# Patient Record
(demographics unavailable — no encounter records)

---

## 2024-10-15 NOTE — ASSESSMENT
[FreeTextEntry1] : #Lichen Planopilaris, posterior scalp - new dx of uncertain prognosis Biopsy proven 8/26/24 Pathology findings of "Perifollicular fibromyxoid change and lymphocytic infiltrate, consistent with  lichen planopilaris in a correct clinical setting" reviewed with patient - Diagnosis, chronic nature, disease course, treatment options and goals of therapy discussed - C/w triamcinolone ointment BID PRN itch or pain on scalp for 2 weeks on, 1 week off cycles. SED, avoid on face/groin  #Seborrheic dermatitis - chronic; stable - Continue maintenance therapy with ketoconazole shampoo alternating with OTC Selsun Blue/Tgel/H&S  #Folliculitis - chronic; stable - Refilled clindamycin solution  RTC 6mo FBSE

## 2024-10-15 NOTE — HISTORY OF PRESENT ILLNESS
[FreeTextEntry1] : RPV: LPP [de-identified] : ALF MOFFETT is a 71 year old w/hx of Heart Transplant on Cellcept and prograf here for suture removal. Last FBSE 8/26/24  #LPP, posterior scalp - s/p biopsy 8/26/24  "Perifollicular fibromyxoid change and lymphocytic infiltrate, consistent with  lichen planopilaris in a correct clinical setting" Patient endorses improvement in itch while using triamcinolone with near 90% improvement, requesting refills of triamcinolone.  #Also would like refill of clindamycin for chest folliculitis  Derm Hx: AKs; seb derm of face (controlled with PRN HC/desonide topical); toenail onychomycosis Personal Hx of skin cancer: yes, multiple NMSC - SCCis on crown/frontal scalp, bx 1/24/22, s/p Efudex cream x3 weeks with clearance in March 2022 - BCC R nasal ala s/p Mohs w/ Dr. Light in Feb 2020  - SCCIS vertex scalp s/p Mohs w/ Dr. Light  in June 2019 - BCC R forearm s/p Mohs with Dr. Guzman in 2015 FHx of skin cancer: Mother with NMSC Social Hx: Retired banker  Cardiac transplant specialist: Dr. Jamal Galan ([facility] [phone]) PCP: Dr. Eleazar Sorto ([phone])

## 2024-10-15 NOTE — HISTORY OF PRESENT ILLNESS
[FreeTextEntry1] : RPV: LPP [de-identified] : ALF MOFFETT is a 71 year old w/hx of Heart Transplant on Cellcept and prograf here for suture removal. Last FBSE 8/26/24  #LPP, posterior scalp - s/p biopsy 8/26/24  "Perifollicular fibromyxoid change and lymphocytic infiltrate, consistent with  lichen planopilaris in a correct clinical setting" Patient endorses improvement in itch while using triamcinolone with near 90% improvement, requesting refills of triamcinolone.  #Also would like refill of clindamycin for chest folliculitis  Derm Hx: AKs; seb derm of face (controlled with PRN HC/desonide topical); toenail onychomycosis Personal Hx of skin cancer: yes, multiple NMSC - SCCis on crown/frontal scalp, bx 1/24/22, s/p Efudex cream x3 weeks with clearance in March 2022 - BCC R nasal ala s/p Mohs w/ Dr. Light in Feb 2020  - SCCIS vertex scalp s/p Mohs w/ Dr. Light  in June 2019 - BCC R forearm s/p Mohs with Dr. Guzman in 2015 FHx of skin cancer: Mother with NMSC Social Hx: Retired banker  Cardiac transplant specialist: Dr. Jamal Galan ([facility] [phone]) PCP: Dr. Eleazar Sorto ([phone])

## 2024-10-15 NOTE — PHYSICAL EXAM
[Alert] : alert [Oriented x 3] : ~L oriented x 3 [Well Nourished] : well nourished [Conjunctiva Non-injected] : conjunctiva non-injected [No Visual Lymphadenopathy] : no visual  lymphadenopathy [No Clubbing] : no clubbing [No Edema] : no edema [No Bromhidrosis] : no bromhidrosis [No Chromhidrosis] : no chromhidrosis [Full Body Skin Exam Performed] : performed [Declined] : declined [FreeTextEntry3] : Focused exam only (see below) per patient request:  erythematous thin plaque R vertex/superior occiput scalp
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
## 2024-11-06 NOTE — PHYSICAL EXAM
[Obese] : obese [No Xanthelasma] : no xanthelasma [Normal Venous Pressure] : normal venous pressure [No Murmur] : no murmur [No Rub] : no rub [No Gallop] : no gallop [Soft] : abdomen soft [Non Tender] : non-tender [Normal Bowel Sounds] : normal bowel sounds [Normal] : no edema, no cyanosis, no clubbing, no varicosities [Normal Radial B/L] : normal radial B/L [No Rash] : no rash [Moves all extremities] : moves all extremities [Normal Speech] : normal speech [Alert and Oriented] : alert and oriented [de-identified] : unable to assess - wearing a mask. [de-identified] : JVP < 6 cm H2O, no HJR [de-identified] : tachycardic, but regular S1S2 [de-identified] : unable to appreciate HSM or masses due to body habitus [de-identified] : hard of hearing

## 2024-11-06 NOTE — ASSESSMENT
[FreeTextEntry1] : 70 y/o M s/p OHT 2013 (CMV -/+, Toxo -/-) at [facility], unremarkable post operative course, most recent active issues include CKD 3, BPH s/p TURP 2023, squamous cell carcinoma, and basal cell carcinoma, who transferred his transplant care to [facility], and is here today for routine follow-up. He is overall doing very well.  s/p OHT and immunosuppression - continue tacrolimus goal ~5-7. - continue cellcept 250mg po bid (reduced from 500 mg BID on 22) due to hx of skin cancer and leukopenia - remains off prednisone  #TCAD prevention  - continue ASA/statin. Lipid panel 6/3/24:  TG 88 HDL 41 LDL 79. Switched from pravastatin to crestor 10mg on 22. Encouraged weight loss and an improved diet, goal LDL < 70. Check lipid panel with next set of labs - Annual Dobutamine stress echo 22 showed no inducible ischemia - TTE 23 showed normal biventricular function - repeat TTE done today, will followup results - Plan for  TTE in 6 months - Heartcare 23: allomap =38, allosure < 0.04 (previous allomaps 30-37); Will obtain a heartcare today - This patient has a heart allograft and is at risk for rejection through immune system recognition of non-self tissue. AlloMap and AlloSure and noninvasive tests ordered to evaluate for the probability of rejection after pretest and assist in immunosuppression management. Studies have shown that these tests can help to rule out rejection without subjecting the patient to the bleeding, arrhythmia, and structural damage risks of invasive endomyocardial biopsies. The AlloMap and AlloSure tests help guide clinical decision making for this patient's surveillance schedule and immunosuppression adjustments.  #CKD stage 3b - stable - minimize any nephrotoxic medications - avoided mTOR given recurrent skin lesions/peels and need for wound healing  #Squamous cell carcinoma in situ of the crown of the scalp and Actinic keratoses, s/p Tissue biopsy 22 - [facility] dermatology following - continue topical chemotherapy with Efudex - F/u every 3-6 months  #BPH, overactive bladder - pt followed by [facility] urology -s/p vaporization of prostate stricture on  with Dr Shailesh Vargas - S/p TURP procedure 2023 - c/w finasteride  #obese - pt had lost weight earlier this year with exercise and dieting, encouraged to continue weight loss and exercise  #osteopenia - referral to Dr. Rich for bone health consult - continue calcium +vitamin d 1 tablet bid  #health maintenance - continue folic acid and MVI daily - continue magnesium oxide  - DEXA 2023: +osteopenia - colonoscopy 21: +diverticulosis in the sigmoid colon and in the ascending colon.  Repeat in 5 years for surveillance. - f/u with PMD for RHCM

## 2024-11-06 NOTE — CARDIOLOGY SUMMARY
[de-identified] : 11/6/24: pending 07/12/23:  bpm, low voltage in precordial leads, nonspecific Tw abnormality. Compared to 5/3/23 no significant change. 05/03/23:  bpm, low voltage in precordial leads, nonspecific Tw abnormality. Compared to 9/14/22, no significant change. 09/14/22:  bpm, low voltage in precordial leads, nonspecific Tw abnormality. Compared to 3/23/22, no significant change. 3/23/22: , low voltage in precordial leads, diffuse nonspecific Tw abnormality. Compared to 9/21/21 no significant change. [de-identified] : 6/12/24: TTE: CONCLUSIONS:  1. Left ventricular cavity is normal in size. Left ventricular wall thickness is normal. Left ventricular systolic function is normal with an ejection fraction of 69 % by 3D. There are no regional wall motion abnormalities seen.  2. Normal left ventricular diastolic function, with normal filling pressure.  3. Normal right ventricular cavity size, with normal wall thickness, and normal systolic function. Tricuspid annular plane systolic excursion (TAPSE) is 1.7 cm (normal >=1.7 cm). Tricuspid annular tissue Doppler S' is 12.0 cm/s (normal >10 cm/s).  4. Estimated pulmonary artery systolic pressure is 27 mmHg.  5. No pericardial effusion seen.  6. Compared to the transthoracic echocardiogram performed on 11/27/2023, there have been no significant interval changes.  7. Left ventricular global longitudinal strain is -18.1 % which is normal (< -18%). Images were acquired on a Hester ultrasound system and processed using [employer] strain analysis software with a heart rate of 88 bpm and a blood pressure of 14/95 mmHg.   11/27/23 Echo: LVEF 63%, GLS -20.0%, normal biv function, no valvular abnormalites, no pericardial effusion. compared to TTE 4/26/23 there have been no significant interval changes 4/26/23 Echo: LVEF 56%, LVIDd 4.8cm, normal biv function, no valvular abnormalities, no significant TR 9/14/22: Dobutamine stress echo: Conclusions: normal hemodynamic response, normal EKG response, normal augmentation in LVSF, marked increase in the LV and RV contractility, no segmental wall motion abnormalities. Normal pharmacologic stress response with no evidence of inducible ischemia. Compared to TTE 3/23/22, no significant changes noted. 3/23/22: EF >75% LVIDd 2.6cm, normal biV function, no valvular abnormalities, minimal TR, and no pericardial effusion  8/21/20 TTE @ [facility]: normal biV function, similar to the study from 9/11/2019 [de-identified] : \par  Myocardial Perfusion PET @ [facility] (8/5/2019): Impression: no chest discomfort and no ECG ischemia after regadenoson stress. No evidence of abnormal myocardial perfusion. Excellent quantitative blood flow values, thus no evidence of CAV. Preserved LV systolic function. No coronary calcium was appreciated.  [de-identified] : \tahir HANSON @ [facility] 8/21/20: no coronary artery disease [de-identified] : DEXA 4/24/23: osteopenia. TREATMENT RECOMMENDATIONS:  Based on NOF treatment guidelines medical therapy is not recommended at this time.
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
## 2024-11-20 NOTE — HEALTH RISK ASSESSMENT
[Yes] : Yes [Monthly or less (1 pt)] : Monthly or less (1 point) [No] : In the past 12 months have you used drugs other than those required for medical reasons? No [No falls in past year] : Patient reported no falls in the past year [0] : 2) Feeling down, depressed, or hopeless: Not at all (0) [PHQ-2 Negative - No further assessment needed] : PHQ-2 Negative - No further assessment needed [Never] : Never [Patient reported bone density results were abnormal] : Patient reported bone density results were abnormal [Patient reported colonoscopy was abnormal] : Patient reported colonoscopy was abnormal [HIV test declined] : HIV test declined [Hepatitis C test declined] : Hepatitis C test declined [None] : None [With Significant Other] : lives with significant other [Retired] : retired [] :  [Sexually Active] : sexually active [Fully functional (bathing, dressing, toileting, transferring, walking, feeding)] : Fully functional (bathing, dressing, toileting, transferring, walking, feeding) [Fully functional (using the telephone, shopping, preparing meals, housekeeping, doing laundry, using] : Fully functional and needs no help or supervision to perform IADLs (using the telephone, shopping, preparing meals, housekeeping, doing laundry, using transportation, managing medications and managing finances) [Reports changes in hearing] : Reports changes in hearing [Smoke Detector] : smoke detector [Carbon Monoxide Detector] : carbon monoxide detector [Seat Belt] :  uses seat belt [With Patient/Caregiver] : , with patient/caregiver [de-identified] : Has been walking more. [ECV1Omlma] : 0 [Change in mental status noted] : No change in mental status noted [Language] : denies difficulty with language [Behavior] : denies difficulty with behavior [Handling Complex Tasks] : denies difficulty handling complex tasks [Reasoning] : denies difficulty with reasoning [Reports changes in vision] : Reports no changes in vision [Reports changes in dental health] : Reports no changes in dental health [Sunscreen] : does not use sunscreen [BoneDensityDate] : 04/23 [BoneDensityComments] : Osteopenia [ColonoscopyDate] : 06/21 [ColonoscopyComments] : 6/21/2021, 1 tubular adenoma, Dr. Jett Aponte, 5-year follow-up. [de-identified] : Severe hearing loss.  Needs hearing aids. Tried them - didn't help. [de-identified] : Going regularly. [de-identified] : Dr. Lorenzo Thomas, Dr. Gifford [AdvancecareDate] : 11/20/2024 [FreeTextEntry4] : Written materials for preparing a Health Care Proxy were previously given to patient, and I encouraged the patient to complete a Health Care Proxy.

## 2024-11-20 NOTE — PHYSICAL EXAM
[No Acute Distress] : no acute distress [Well Nourished] : well nourished [Well Developed] : well developed [Well-Appearing] : well-appearing [Normal Voice/Communication] : normal voice/communication [Normal Sclera/Conjunctiva] : normal sclera/conjunctiva [PERRL] : pupils equal round and reactive to light [EOMI] : extraocular movements intact [Normal Outer Ear/Nose] : the outer ears and nose were normal in appearance [Normal Oropharynx] : the oropharynx was normal [Normal TMs] : both tympanic membranes were normal [No JVD] : no jugular venous distention [No Lymphadenopathy] : no lymphadenopathy [Supple] : supple [Thyroid Normal, No Nodules] : the thyroid was normal and there were no nodules present [No Respiratory Distress] : no respiratory distress  [No Accessory Muscle Use] : no accessory muscle use [Clear to Auscultation] : lungs were clear to auscultation bilaterally [Normal Rate] : normal rate  [Regular Rhythm] : with a regular rhythm [Normal S1, S2] : normal S1 and S2 [No Murmur] : no murmur heard [No Carotid Bruits] : no carotid bruits [No Abdominal Bruit] : a ~M bruit was not heard ~T in the abdomen [No Varicosities] : no varicosities [Pedal Pulses Present] : the pedal pulses are present [No Edema] : there was no peripheral edema [No Palpable Aorta] : no palpable aorta [No Extremity Clubbing/Cyanosis] : no extremity clubbing/cyanosis [Soft] : abdomen soft [Non Tender] : non-tender [Non-distended] : non-distended [No Masses] : no abdominal mass palpated [No HSM] : no HSM [Normal Bowel Sounds] : normal bowel sounds [No Hernias] : no hernias [Declined Rectal Exam] : declined rectal exam [Penis Abnormality] : normal uncircumcised penis [Testes Tenderness] : no tenderness of the testes [Scrotum] : the scrotum was normal [Testes Mass (___cm)] : there were no testicular masses [No CVA Tenderness] : no CVA  tenderness [No Spinal Tenderness] : no spinal tenderness [No Joint Swelling] : no joint swelling [Grossly Normal Strength/Tone] : grossly normal strength/tone [No Rash] : no rash [Coordination Grossly Intact] : coordination grossly intact [No Focal Deficits] : no focal deficits [Normal Gait] : normal gait [Deep Tendon Reflexes (DTR)] : deep tendon reflexes were 2+ and symmetric [Normal Affect] : the affect was normal [Normal Insight/Judgement] : insight and judgment were intact [FreeTextEntry1] : (deferred - done by Urologist)

## 2024-11-20 NOTE — HISTORY OF PRESENT ILLNESS
[de-identified] : Patient presents for a subsequent Medicare Annual Wellness Visit.  Patient is a 72-year-old male with a history of a cardiac transplant at [facility] 2013.  He previously had an MI 2008 with severe heart failure.  He is now followed by Dr. Gutierrez ([facility]) and is on anti-rejection medications.  He has CKD, prediabetes, BPH, osteopenia, hearing loss.  He sees Dr. Mims () for BPH and ED.  He had a TURP in 2023.  He will be seeing nephrology soon for the CKD.  Patient has been having difficulty sleeping.  He does watch TV and falls asleep watching it.  He has mirtazapine 15mg with some help.  He has had sleep evaluations twice.  Patient has some memory loss.  He forgets where he put things.  He sometimes forgets what he was trying to do at a given moment.  He will see his Neurologist.

## 2025-01-03 NOTE — HISTORY OF PRESENT ILLNESS
[FreeTextEntry8] : Patient has a URI for 3 days.  He was coughing, which was worsening.  He had some phlegm production, which was clear and non-bloody.  No fevers, chills, chest pain, dyspnea.  No body aches other than chronic back pains.  No rashes.  No GI upset other than constipation.  No travel or sick contacts.  He took Mucinex-DM which helped.  He got drowsy from it.  He developed a sore throat 2 days ago, and this was worse last night.  He had negative home COVID-19 tests last night and this morning.

## 2025-01-03 NOTE — PHYSICAL EXAM
[Normal Voice/Communication] : normal voice/communication [Normal Outer Ear/Nose] : the outer ears and nose were normal in appearance [Normal TMs] : both tympanic membranes were normal [Normal] : normal rate, regular rhythm, normal S1 and S2 and no murmur heard [No Edema] : there was no peripheral edema [Soft] : abdomen soft [Non Tender] : non-tender [Non-distended] : non-distended [No Masses] : no abdominal mass palpated [Normal Bowel Sounds] : normal bowel sounds [Normal Posterior Cervical Nodes] : no posterior cervical lymphadenopathy [Normal Anterior Cervical Nodes] : no anterior cervical lymphadenopathy [de-identified] : +mild pharyngeal erythema.  No sinus tenderness.

## 2025-01-03 NOTE — ASSESSMENT
[FreeTextEntry1] : Patient has a URI which is likely viral.  Will send a throat culture to rule out strep throat.  Would hold antibiotics for now, but he can hold onto a script for Augmentin for now.  If he gets worsening symptoms, such as fever, purulent blood tinged sputum, then he can start the antibiotic.  He can use the benzonatate for the cough (refill given today).  Patient is to get adequate rest/sleep, hydration, and nutrition, and call for worsening or nonresolving symptoms.

## 2025-01-17 NOTE — ASSESSMENT
[FreeTextEntry1] : Patient is a 71 yo M with PMHx of heart transplant and BPH status post TURP who presents for urinary urgency and urethral stricture.  History of BPH status post cystoscopy with transurethral resection of prostate and removal of bladder stones on 7/26/23 by Dr. Palomino - initially LUTS improved but now worsened Cystoscopy in office showed prostatic stricture. S/p TUIP/BN, steroid injection 4/17/24.  Overall improved/stable LUTS. UF today only 60cc vv, Qmax therefore lower 6ml/s and less accurate PVR 25 cc Cont vesicare  F/u 4-5 mos  Pt will require longitudinal care for pt's chronic/complex urologic conditions.

## 2025-01-17 NOTE — HISTORY OF PRESENT ILLNESS
[FreeTextEntry1] : Patient is a 72 yo M with a history of BPH status pos TURP who presents for  HPI: History of BPH status post cystoscopy with transurethral resection of prostate and removal of bladder stones on 7/26/23 by Dr. Palomino. He subsequently developed increased nocturia, FOS weaker, straining. He has stopped terazosin and finasteride post procedure. Then he tried solifernacin for urinary frequency and urgency with slight improvement. Nocturia x 5-6, weaker stream, incomplete empty well. He said his stream was previously strong and occasionally its strong now too, but has become weaker and now has intermittency with his urinary stream. He underwent cystoscopy today with intraprostatic stricture noted. 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 Here for f/u. Reports doing very well. At least 80% better. Voiding well, good FOS. No incontinence. However, he was feeling more frequency/urgency/nocturia ~2-3. Denies straining, hematuria, dysuria. He was given vesicare 10 mg - reports ~40-50% improvement in frequency/urgency. Nocturia now x1-2. Tolerating well. He had a strong urge to void today -- voided 140cc for UF, good Qmax overall 14.3 with bell shaped curve. PVR 4cc.  1/17/25 Patient presents for follow up. Reports FOS stronger. continuous improvement on vesicare.  Feels urgency/frequency is much improved - approximately 50-60%, sensation of emptying well, nocturia x 1-2.   Feels he is voiding better now than immediately after surgery.    PSA 0.27 - 6/2024 11/2024  Cr - 1.8, CBC wnl

## 2025-01-22 NOTE — PHYSICAL EXAM
[General Appearance - Alert] : alert [General Appearance - In No Acute Distress] : in no acute distress [Sclera] : the sclera and conjunctiva were normal [PERRL With Normal Accommodation] : pupils were equal in size, round, and reactive to light [Extraocular Movements] : extraocular movements were intact [Auscultation Breath Sounds / Voice Sounds] : lungs were clear to auscultation bilaterally [Heart Rate And Rhythm] : heart rate was normal and rhythm regular [Heart Sounds] : normal S1 and S2 [Heart Sounds Gallop] : no gallops [Murmurs] : no murmurs [Heart Sounds Pericardial Friction Rub] : no pericardial rub [Edema] : there was no peripheral edema [Bowel Sounds] : normal bowel sounds [Abdomen Soft] : soft [Abdomen Tenderness] : non-tender [] : no hepato-splenomegaly [Abdomen Mass (___ Cm)] : no abdominal mass palpated [No CVA Tenderness] : no ~M costovertebral angle tenderness [No Spinal Tenderness] : no spinal tenderness [Abnormal Walk] : normal gait [Nail Clubbing] : no clubbing  or cyanosis of the fingernails [Musculoskeletal - Swelling] : no joint swelling seen [Motor Tone] : muscle strength and tone were normal [Deep Tendon Reflexes (DTR)] : deep tendon reflexes were 2+ and symmetric [Sensation] : the sensory exam was normal to light touch and pinprick [No Focal Deficits] : no focal deficits [Oriented To Time, Place, And Person] : oriented to person, place, and time [Impaired Insight] : insight and judgment were intact [Affect] : the affect was normal

## 2025-01-24 NOTE — ASSESSMENT
[FreeTextEntry1] : 72 M with history of HTN, HLD, CAD/MI, HF s/p heart transplant in 2013 @ [facility], CKD, skin cancers, BPH s/p cystoscopy and TURP, bladder stones, who presents for evaluation of abnormal renal function.  - CKD stage 3 due to CRS +/- CNI nephrotox with overall stable renal indices albeit uptick in creatinine a/w supratherapeutic FK for which tacro was reduced.  Prior to cardiac transplant he had evidence of CKD with an elevated creatinine, then post transplant his Cr maycol and stabilized around ~1.6-1.7, non-proteinuric. He had a uptick in creatinine in 11/2024 to 1.8 a/w likely supratherapeutic FK levels thus tacrolimus dose decreased slightly. He is on lower dose MMF d/t skin cancer. Likely CKD stage 3 due to history of cardio-renal syndrome, CNI nephrotoxicity with a baseline creatinine ~1.7. He is non-proteinuric.    PLAN: - labs with cardiac team as planned - immunosuppression per cardiac team, if renal indies worsen can consider reducing tacro dose and adding sirolimus for coverage but not necessary at this time  - avoid NSAIDs and nephrotoxins as able - dose meds for current eGFR - f/u in ~6 months as planned

## 2025-01-24 NOTE — HISTORY OF PRESENT ILLNESS
[FreeTextEntry1] : 72 M with history of HTN, HLD, CAD/MI, HF s/p heart transplant in 2013 @ [facility], CKD, skin cancers, BPH s/p cystoscopy and TURP, bladder stones, who presents for evaluation of abnormal renal function. Has not seen nephrology in the past. Has noted elevated creatinine of ~1.4  pre-transplant where as post transplant his Cr maycol and stabilized around ~1.6-1.7. He had a uptick in creatinine in 11/2024 to 1.8 a/w likely supratherapeutic FK levels thus tacrolimus dose decreased slightly. He is on lower dose MMF d/t skin cancer. Likely CKD stage 3 due to history of cardio-renal syndrome, CNI nephrotoxicity with a baseline creatinine ~1.7. He is non-proteinuric. He has pre-diabetes, hgba1c stable. He has HTN that is well managed. He no longer has issues with hypervolemia since transplant. He denies NSAIDs, herbal meds, history of kidney transplant, history of dialysis, history of nephrolithiasis or gout. He denies CP, SOB, abd pain, N/V/D, foamy urine, hematuria, dysuria, fever or chills.

## 2025-01-24 NOTE — HISTORY OF PRESENT ILLNESS
[FreeTextEntry1] : 72 M with history of HTN, HLD, CAD/MI, HF s/p heart transplant in 2013 @ [facility], CKD, skin cancers, BPH s/p cystoscopy and TURP, bladder stones, who presents for evaluation of abnormal renal function. Has not seen nephrology in the past. Has noted elevated creatinine of ~1.4  pre-transplant where as post transplant his Cr maycol and stabilized around ~1.6-1.7. He had a uptick in creatinine in 11/2024 to 1.8 a/w likely supratherapeutic FK levels thus tacrolimus dose decreased slightly. He is on lower dose MMF d/t skin cancer. Likely CKD stage 3 due to history of cardio-renal syndrome, CNI nephrotoxicity with a baseline creatinine ~1.7. He is non-proteinuric. He has pre-diabetes, hgba1c stable. He has HTN that is well managed. He no longer has issues with hypervolemia since transplant. He denies NSAIDs, herbal meds, history of kidney transplant, history of dialysis, history of nephrolithiasis or gout. He denies CP, SOB, abd pain, N/V/D, foamy urine, hematuria, dysuria, fever or chills.

## 2025-01-24 NOTE — ASSESSMENT
[FreeTextEntry1] : 72 M with history of HTN, HLD, CAD/MI, HF s/p heart transplant in 2013 @ [facility], CKD, skin cancers, BPH s/p cystoscopy and TURP, bladder stones, who presents for evaluation of abnormal renal function.  - CKD stage 3 due to CRS +/- CNI nephrotox with overall stable renal indices albeit uptick in creatinine a/w supratherapeutic FK for which tacro was reduced.  Prior to cardiac transplant he had evidence of CKD with an elevated creatinine, then post transplant his Cr maycol and stabilized around ~1.6-1.7, non-proteinuric. He had a uptick in creatinine in 11/2024 to 1.8 a/w likely supratherapeutic FK levels thus tacrolimus dose decreased slightly. He is on lower dose MMF d/t skin cancer. Likely CKD stage 3 due to history of cardio-renal syndrome, CNI nephrotoxicity with a baseline creatinine ~1.7. He is non-proteinuric.    PLAN: - labs with cardiac team as planned - immunosuppression per cardiac team, if renal indies worsen can consider reducing tacro dose and adding sirolimus for coverage but not necessary at this time  - avoid NSAIDs and nephrotoxins as able - dose meds for current eGFR - f/u in ~6 months as planned

## 2025-01-24 NOTE — ASSESSMENT
[FreeTextEntry1] : 72 M with history of HTN, HLD, CAD/MI, HF s/p heart transplant in 2013 @ [facility], CKD, skin cancers, BPH s/p cystoscopy and TURP, bladder stones, who presents for evaluation of abnormal renal function.  - CKD stage 3 due to CRS +/- CNI nephrotox with overall stable renal indices albeit uptick in creatinine a/w supratherapeutic FK for which tacro was reduced.  Prior to cardiac transplant he had evidence of CKD with an elevated creatinine, then post transplant his Cr maycol and stabilized around ~1.6-1.7, non-proteinuric. He had a uptick in creatinine in 11/2024 to 1.8 a/w likely supratherapeutic FK levels thus tacrolimus dose decreased slightly. He is on lower dose MMF d/t skin cancer. Likely CKD stage 3 due to history of cardio-renal syndrome, CNI nephrotoxicity with a baseline creatinine ~1.7. He is non-proteinuric.    PLAN: - labs with cardiac team as planned - immunosuppression per cardiac team, if renal indies worsen can consider reducing tacro dose and adding sirolimus for coverage but not necessary at this time  - avoid NSAIDs and nephrotoxins as able - dose meds for current eGFR - f/u in ~6 months as planned

## 2025-01-24 NOTE — HISTORY OF PRESENT ILLNESS
[FreeTextEntry1] : 72 M with history of HTN, HLD, CAD/MI, HF s/p heart transplant in 2013 @ [facility], CKD, skin cancers, BPH s/p cystoscopy and TURP, bladder stones, who presents for evaluation of abnormal renal function. Has not seen nephrology in the past. Has noted elevated creatinine of ~1.4  pre-transplant where as post transplant his Cr maycol and stabilized around ~1.6-1.7. He had a uptick in creatinine in 11/2024 to 1.8 a/w likely supratherapeutic FK levels thus tacrolimus dose decreased slightly. He is on lower dose MMF d/t skin cancer. Likely CKD stage 3 due to history of cardio-renal syndrome, CNI nephrotoxicity with a baseline creatinine ~1.7. He is non-proteinuric. He has pre-diabetes, hgba1c stable. He has HTN that is well managed. He no longer has issues with hypervolemia since transplant. He denies NSAIDs, herbal meds, history of kidney transplant, history of dialysis, history of nephrolithiasis or gout. He denies CP, SOB, abd pain, N/V/D, foamy urine, hematuria, dysuria, fever or chills.

## 2025-04-07 NOTE — ASSESSMENT
[FreeTextEntry1] : #Seborrheic Keratosis #Lentigines  #Cherry angioma #Multiple melanocytic nevi, benign  #Screening exam for skin cancer - benign findings as above  - TBSE performed today - Advised sun protection.  Recommended OTC sunscreen products, including SPF30+ with broadband UV protection as well as proper use.  Discussed OTC sun protective clothing - Counseled patient to monitor for changes, including ABCDEs of mole monitoring - Discussed self-skin exams. Counseled patient to monitor for changes, including mole monitoring and self-skin exams - rtc q6-12 mo for repeat skin exam or sooner if new/concerning lesion  #Hx of NMSC - NER  #Lichen Planopilaris, posterior scalp - Chronic; stable/improved Biopsy proven 8/26/24 Pathology findings of "Perifollicular fibromyxoid change and lymphocytic infiltrate, consistent with lichen planopilaris in a correct clinical setting" reviewed with patient - Diagnosis, chronic nature, disease course, treatment options and goals of therapy discussed - For now can stop ointment.  If flaring, can continue triamcinolone ointment BID PRN itch or pain on scalp for 2 weeks on, 1 week off cycles. SED, avoid on face/groin  #Seborrheic dermatitis, scalp & face - Chronic condition; stable - Continue ketoconazole 2% shampoo to scalp, apply 2-3x per week, leave in for 5 mins at a time and rinse  - Continue keto cream prn  #Actinic Keratoses x4, Scalp - Patient was verbally consented and the lesions identified above were treated with liquid nitrogen freeze, thaw, freeze x 10 seconds each cycle x 2. Side effects include blister formation, hypopigmentation, and scarring. The patient tolerated the procedure well. Wound care instructions, care of a blister with vaseline, signs and symptoms of infection were discussed in full. The patient denies any questions at this time.  #Inflamed Seborrheic Keratosis - x3, R groin, forehead - Given inflammatory nature of lesions above, will treat with cryosurgery - Patient was verbally consented. Lesions treated with liquid nitrogen f/t/f x2 cycles. Patient tolerated it well. - Side effects include blister formation, hypopigmentation, and scarring - Wound care with vaseline

## 2025-04-07 NOTE — PHYSICAL EXAM
[Alert] : alert [Oriented x 3] : ~L oriented x 3 [Well Nourished] : well nourished [Conjunctiva Non-injected] : conjunctiva non-injected [No Visual Lymphadenopathy] : no visual  lymphadenopathy [No Clubbing] : no clubbing [No Edema] : no edema [No Bromhidrosis] : no bromhidrosis [No Chromhidrosis] : no chromhidrosis [Full Body Skin Exam Performed] : performed [FreeTextEntry3] : Full body skin exam was performed. The patient is well-appearing, in no acute distress, alert and oriented x 3. Mood and affect are normal. A complete cutaneous examination of the scalp, face, neck, chest, abdomen, back, bilateral arms, bilateral legs, buttocks, digits, nails, eyelids, conjunctiva and lips reveals the following significant findings: - Several tan to dark brown homogenous well demarcated macules scattered over body - Scattered round bright red monomorphous papules over trunk and extremities - Scattered flat round brown waxy papules - Several ill defined light pink macules with overlying adherent scale on scalp

## 2025-04-07 NOTE — HISTORY OF PRESENT ILLNESS
[FreeTextEntry1] : RPV: spots [de-identified] : ALF MOFFETT is a 72 year old w/hx of Heart Transplant on Cellcept and prograf here for suture removal.  #Spots on skin- spots scattered on body x years. Asymptomatic and unchanged. Requesting TBSE.  #LPP, posterior scalp - s/p biopsy 8/26/24 "Perifollicular fibromyxoid change and lymphocytic infiltrate, consistent with lichen planopilaris in a correct clinical setting" Overall significantly improved, using TAC ointment 2 weeks on, and keto cream on 1 week off with keto shampoo.  Derm Hx: AKs; seb derm of face (controlled with PRN HC/desonide topical) Personal Hx of skin cancer: yes, multiple NMSC - SCCis on crown/frontal scalp, bx 1/24/22, s/p Efudex cream x3 weeks with clearance in March 2022 - BCC R nasal ala s/p Mohs w/ Dr. Light in Feb 2020  - SCCIS vertex scalp s/p Mohs w/ Dr. Light  in June 2019 - BCC R forearm s/p Mohs with Dr. Guzman in 2015 FHx of skin cancer: Mother with NMSC Social Hx: Retired banker  Cardiac transplant specialist: Dr. Jamal Galan (ANG[facility] [phone]) PCP: Dr. Eleazar Sorto ([phone])

## 2025-04-15 NOTE — ASSESSMENT
[FreeTextEntry1] : Patient s/p cardiac transplant, and followed by transplant team.  His BP is adequately controlled, and his renal function is stable at CKD-3.  He feels well.  No changes to regimen today.  I advised patient that he can get a booster dose of the [id number] COVID-19 booster that he received Oct 2024, as it has been 6 months.  He will consider this.  Follow-up for physical Fall 2025.

## 2025-04-15 NOTE — HISTORY OF PRESENT ILLNESS
[de-identified] : Patient for follow-up.  Patient feels well with no chest pain, dyspnea, palpitations.  His BPs at home are 110s/70s-80s..  Weight 186.6 at home.  He has seen nephrology recently, and no changes to his regimen were needed.  He is planned for an echocardiogram and stress test soon.  He returned to the audiologist and got hearing aids.
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
## 2025-05-05 NOTE — HISTORY OF PRESENT ILLNESS
[FreeTextEntry1] : Primary diagnosis: Osteopenia Other medical problems: OHT in 2013  Interval history: No fractures, no falls. Mentions that he had TURP procedure last month   #Osteopenia Diagnosis: 2021 Current regimen: calcium citrate + Vitamin D 4000IU daily Prior treatment: Denies   Last DXA scan:  DXA  April 2023 Spine 0.2 - normal femoral neck -1.2 - osteopenia total hip -0.6  FRAX score MOF 6.4% and hip fracture 1.4%   April 2021 Lumbar spine T score -0.7 Femoral total T score -0.9 Femoral neck T score -1.7 Forearm distal T score -1.6  FRAX score major osteoporotic fracture 5.1%, hip fracture 0.9%  Falls: Denies FH: mother probably Prior radiation? Denies  Steroid use: Denies- only months after surgery and then taken off  Smoking: Denies  Denies any kidney stones  Denies any fractures Labs:  Calcium: 9.7 Vitamin D 51.9 GFR 43 TSH WNL   #Pre-diabetes  a1c 5.8% in Nov 2023 patient is watching diet closely, exercising regularly  #HLD - Patient is on rosuvastatin 10mg daily  - LDL 79

## 2025-05-05 NOTE — CARDIOLOGY SUMMARY
[de-identified] :  07/12/23:  bpm, low voltage in precordial leads, nonspecific Tw abnormality. Compared to 5/3/23 no significant change. 05/03/23:  bpm, low voltage in precordial leads, nonspecific Tw abnormality. Compared to 9/14/22, no significant change. 09/14/22:  bpm, low voltage in precordial leads, nonspecific Tw abnormality. Compared to 3/23/22, no significant change. 3/23/22: , low voltage in precordial leads, diffuse nonspecific Tw abnormality. Compared to 9/21/21 no significant change. [de-identified] : [place] 5/5/25: pending today [de-identified] : 11/6/24: TTE: CONCLUSIONS:  1. Left ventricular cavity is normal in size. Left ventricular wall thickness is normal. Left ventricular systolic function is normal with an ejection fraction of 65 % by Valdes's method of disks. There are no regional wall motion abnormalities seen.  2. Normal left ventricular diastolic function, with normal left ventricular filling pressure.  3. Normal right ventricular cavity size, with normal wall thickness, and normal right ventricular systolic function.  4. Right ventricular free wall strain is --27 %.  5. No pericardial effusion seen.  6. Compared to the transthoracic echocardiogram performed on 6/12/2024, there have been no significant interval changes.  7. There is normal LV mass and normal geometry. 6/12/24: TTE: CONCLUSIONS:  1. Left ventricular cavity is normal in size. Left ventricular wall thickness is normal. Left ventricular systolic function is normal with an ejection fraction of 69 % by 3D. There are no regional wall motion abnormalities seen.  2. Normal left ventricular diastolic function, with normal filling pressure.  3. Normal right ventricular cavity size, with normal wall thickness, and normal systolic function. Tricuspid annular plane systolic excursion (TAPSE) is 1.7 cm (normal >=1.7 cm). Tricuspid annular tissue Doppler S' is 12.0 cm/s (normal >10 cm/s).  4. Estimated pulmonary artery systolic pressure is 27 mmHg.  5. No pericardial effusion seen.  6. Compared to the transthoracic echocardiogram performed on 11/27/2023, there have been no significant interval changes.  7. Left ventricular global longitudinal strain is -18.1 % which is normal (< -18%). Images were acquired on a [employer]ester ultrasound system and processed using [employer] strain analysis software with a heart rate of 88 bpm and a blood pressure of 14/95 mmHg.   11/27/23 Echo: LVEF 63%, GLS -20.0%, normal biv function, no valvular abnormalites, no pericardial effusion. compared to TTE 4/26/23 there have been no significant interval changes 4/26/23 Echo: LVEF 56%, LVIDd 4.8cm, normal biv function, no valvular abnormalities, no significant TR 9/14/22: Dobutamine stress echo: Conclusions: normal hemodynamic response, normal EKG response, normal augmentation in LVSF, marked increase in the LV and RV contractility, no segmental wall motion abnormalities. Normal pharmacologic stress response with no evidence of inducible ischemia. Compared to TTE 3/23/22, no significant changes noted. 3/23/22: EF >75% LVIDd 2.6cm, normal biV function, no valvular abnormalities, minimal TR, and no pericardial effusion  8/21/20 TTE @ [facility]: normal biV function, similar to the study from 9/11/2019 [de-identified] : \par  Myocardial Perfusion PET @ [facility] (8/5/2019): Impression: no chest discomfort and no ECG ischemia after regadenoson stress. No evidence of abnormal myocardial perfusion. Excellent quantitative blood flow values, thus no evidence of CAV. Preserved LV systolic function. No coronary calcium was appreciated.  [de-identified] : \tahir HANSON @ [facility] 8/21/20: no coronary artery disease [de-identified] : DEXA 4/24/23: osteopenia. TREATMENT RECOMMENDATIONS:  Based on NOF treatment guidelines medical therapy is not recommended at this time.

## 2025-05-05 NOTE — ASSESSMENT
[FreeTextEntry1] : #Osteopenia -DXA scan from 2023 showed only osteopenia in the femoral neck  - FRAX score didn't meet criteria for treatment.  Plan: - Due for repeat DXA scan- will order today - continue with calcium supplement and vitamin D - continue with weight bearing exercises  - check PTH, Vitamin D and phos with next set of labs   #Prediabetes -Hemoglobin A1c 5.8% - Patient is making lifestyle changes  #HLD - continue with rosuvastatin 10mg daily  - LDL 75  RTC in 1 year

## 2025-05-05 NOTE — PHYSICAL EXAM
[Obese] : obese [No Xanthelasma] : no xanthelasma [Normal Venous Pressure] : normal venous pressure [No Murmur] : no murmur [No Rub] : no rub [No Gallop] : no gallop [Soft] : abdomen soft [Non Tender] : non-tender [Normal Bowel Sounds] : normal bowel sounds [Normal] : no edema, no cyanosis, no clubbing, no varicosities [Normal Radial B/L] : normal radial B/L [No Rash] : no rash [Moves all extremities] : moves all extremities [Normal Speech] : normal speech [Alert and Oriented] : alert and oriented [de-identified] : unable to assess - wearing a mask. [de-identified] : JVP < 6 cm H2O, no HJR [de-identified] : tachycardic, but regular S1S2 [de-identified] : unable to appreciate HSM or masses due to body habitus [de-identified] : hard of hearing
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
## 2025-05-23 NOTE — HISTORY OF PRESENT ILLNESS
[FreeTextEntry1] : 1/21/2025 72 M with history of HTN, HLD, CAD/MI, HF s/p heart transplant in 2013 @ [facility], CKD, skin cancers, BPH s/p cystoscopy and TURP, bladder stones, who presents for evaluation of abnormal renal function. Has not seen nephrology in the past. Has noted elevated creatinine of ~1.4  pre-transplant where as post transplant his Cr maycol and stabilized around ~1.6-1.7. He had a uptick in creatinine in 11/2024 to 1.8 a/w likely supratherapeutic FK levels thus tacrolimus dose decreased slightly. He is on lower dose MMF d/t skin cancer. Likely CKD stage 3 due to history of cardio-renal syndrome, CNI nephrotoxicity with a baseline creatinine ~1.7. He is non-proteinuric. He has pre-diabetes, hgba1c stable. He has HTN that is well managed. He no longer has issues with hypervolemia since transplant. He denies NSAIDs, herbal meds, history of kidney transplant, history of dialysis, history of nephrolithiasis or gout. He denies CP, SOB, abd pain, N/V/D, foamy urine, hematuria, dysuria, fever or chills.    5/23/2025 Last visit:   1/21/2025      Labs: 5/16/2025 Patient seen and examined, med list reviewed and updated. Vital signs stable. Checks BP at home, readings grossly acceptable. Since last visit: got hearing aides, tacro to 2.5 AM/PM by [facility] Heart Transplant team. No acute distress, no new complaints or concerns. Normal appetite, hydrates well. No LE edema. Patient denies weight changes, headache, dizziness, CP, SOB, abd pain, n/v/d, hematuria, dysuria, foamy urine, fever or chills.

## 2025-05-23 NOTE — ASSESSMENT
[FreeTextEntry1] : 72 M with history of HTN, HLD, CAD/MI, HF s/p heart transplant in 2013 @ [facility], CKD, skin cancers, BPH s/p cystoscopy and TURP, bladder stones, who presents for follow up of abnormal renal function.  - CKD stage 3 due to CRS +/- CNI nephrotoxicity with overall stable renal indices albeit uptick in creatinine a/w supratherapeutic FK for which tacro was reduced again.  Prior to cardiac transplant he had evidence of CKD with an elevated creatinine, then post transplant his Cr maycol and stabilized around ~1.6-1.7, non-proteinuric. He had a uptick in creatinine in 11/2024 to 1.8 a/w likely supratherapeutic FK levels thus tacrolimus dose decreased slightly. He is on lower dose MMF d/t skin cancer. Likely CKD stage 3 due to history of cardio-renal syndrome, CNI nephrotoxicity with a baseline creatinine ~1.7. He is non-proteinuric.    - immunosuppression per cardiac team, if renal indices worsen can consider reducing tacro dose and adding sirolimus for coverage but not necessary at this time  - avoid NSAIDs and nephrotoxins as able - dose meds for current eGFR - f/u in ~ 6 months as planned

## 2025-06-06 NOTE — HISTORY OF PRESENT ILLNESS
[FreeTextEntry1] : Patient is a 71 yo M with a history of BPH status pos TURP who presents for LUTS, prostate stricture.  HPI: History of BPH status post cystoscopy with transurethral resection of prostate and removal of bladder stones on 7/26/23 by Dr. Palomino. He subsequently developed increased nocturia, FOS weaker, straining. He has stopped terazosin and finasteride post procedure. Then he tried solifernacin for urinary frequency and urgency with slight improvement. Nocturia x 5-6, weaker stream, incomplete empty well. He said his stream was previously strong and occasionally its strong now too, but has become weaker and now has intermittency with his urinary stream. He underwent cystoscopy today with intraprostatic stricture noted. 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 Here for f/u. Reports doing very well. At least 80% better. Voiding well, good FOS. No incontinence. However, he was feeling more frequency/urgency/nocturia ~2-3. Denies straining, hematuria, dysuria. He was given vesicare 10 mg - reports ~40-50% improvement in frequency/urgency. Nocturia now x1-2. Tolerating well. He had a strong urge to void today -- voided 140cc for UF, good Qmax overall 14.3 with bell shaped curve. PVR 4cc.  1/17/25 Patient presents for follow up. Reports FOS stronger. continuous improvement on vesicare.  Feels urgency/frequency is much improved - approximately 50-60%, sensation of emptying well, nocturia x 1-2.   Feels he is voiding better now than immediately after surgery.   PSA 0.27 - 6/2024 11/2024  Cr - 1.8, CBC wnl  6/6/25 Here for f/u.  Reports stable LUTS.  Good FOS.  Reports improved urgency/frequency - sensation of emptying well, nocturia x1-2. UF/PVR - Qmax 12.5 ml/s, PVR 100cc.

## 2025-06-06 NOTE — ASSESSMENT
[FreeTextEntry1] : Patient is a 71 yo M who presents for LUTS, history of BPH status post cystoscopy with transurethral resection of prostate and removal of bladder stones on 7/26/23 by Dr. Palomino.  Prostate stricture s/p TUIP/BN, steroid injection 4/17/24.  LUTS - stably improved after TUIP/BN PVR today is slightly more elevated.  UF Qmax is 12.5 ml/s - slight decreased Overall he feels asymptomatic - will observe at this time PSA today - pt stqates if normal he will check on portal F/u 4-6 mos  Pt will require longitudinal care for pt's chronic/complex urologic conditions.

## 2025-06-10 NOTE — HISTORY OF PRESENT ILLNESS
[FreeTextEntry8] : Patient developed pain in the R ear on Saturday 6/7/2025.  He describes it as a "pounding" pain.  There was some fluid and a little blood.  He has chronic tinnitus.  He cleaned his hearing aids.  No vertigo, fevers, chills, nausea, vomiting, diarrhea.  He had a nosebleed last night.  No coughing, body aches, sore throat.  He did not self-medicate.  He used a Q-tip externally.  No travel or sick contacts.

## 2025-06-10 NOTE — PHYSICAL EXAM
[Normal Voice/Communication] : normal voice/communication [Normal Oropharynx] : the oropharynx was normal [Normal] : normal rate, regular rhythm, normal S1 and S2 and no murmur heard [No Edema] : there was no peripheral edema [Soft] : abdomen soft [Non Tender] : non-tender [Non-distended] : non-distended [No Masses] : no abdominal mass palpated [Normal Bowel Sounds] : normal bowel sounds [de-identified] : No sinus tenderness.  +erythema along the auditory canal with mild exudate/discharge.  Poor visualization of TMs bilaterally.

## 2025-06-10 NOTE — ASSESSMENT
[FreeTextEntry1] : Patient likely has otitis externa given the canal erythema and the discharge, although the TM is poorly visualized.  Will give a script for Cortisporin Otic, and patient was advised to avoid placing objects into the ear.  He was given a supply of amoxicillin as a back-up in case his symptoms are not improving.  He was given several ENT offices for options to get an appointment to be seen if his symptoms do not improve.